# Patient Record
Sex: MALE | Race: BLACK OR AFRICAN AMERICAN | NOT HISPANIC OR LATINO | Employment: FULL TIME | URBAN - METROPOLITAN AREA
[De-identification: names, ages, dates, MRNs, and addresses within clinical notes are randomized per-mention and may not be internally consistent; named-entity substitution may affect disease eponyms.]

---

## 2024-04-20 ENCOUNTER — LAB REQUISITION (OUTPATIENT)
Dept: LAB | Facility: HOSPITAL | Age: 44
End: 2024-04-20
Payer: COMMERCIAL

## 2024-04-21 LAB — PSA SERPL-MCNC: 0.38 NG/ML

## 2024-06-03 PROBLEM — R73.09 ELEVATED GLUCOSE: Status: ACTIVE | Noted: 2024-06-03

## 2024-06-03 PROBLEM — G89.29 BACK PAIN, CHRONIC: Status: ACTIVE | Noted: 2024-06-03

## 2024-06-03 PROBLEM — E55.9 VITAMIN D DEFICIENCY: Status: ACTIVE | Noted: 2024-06-03

## 2024-06-03 PROBLEM — H90.3 BILATERAL SENSORINEURAL HEARING LOSS: Status: ACTIVE | Noted: 2024-06-03

## 2024-06-03 PROBLEM — M54.9 BACK PAIN, CHRONIC: Status: ACTIVE | Noted: 2024-06-03

## 2024-06-03 PROBLEM — D69.6 DECREASED PLATELET COUNT (CMS-HCC): Status: ACTIVE | Noted: 2024-06-03

## 2024-06-03 PROBLEM — H93.13 SUBJECTIVE TINNITUS OF BOTH EARS: Status: ACTIVE | Noted: 2024-06-03

## 2025-02-12 ENCOUNTER — APPOINTMENT (OUTPATIENT)
Dept: RADIOLOGY | Facility: HOSPITAL | Age: 45
End: 2025-02-12
Payer: COMMERCIAL

## 2025-02-12 ENCOUNTER — HOSPITAL ENCOUNTER (EMERGENCY)
Facility: HOSPITAL | Age: 45
Discharge: HOME | End: 2025-02-12
Payer: COMMERCIAL

## 2025-02-12 ENCOUNTER — HOSPITAL ENCOUNTER (OUTPATIENT)
Dept: CARDIOLOGY | Facility: HOSPITAL | Age: 45
Discharge: HOME | End: 2025-02-12
Payer: COMMERCIAL

## 2025-02-12 VITALS
HEART RATE: 80 BPM | WEIGHT: 218 LBS | RESPIRATION RATE: 18 BRPM | DIASTOLIC BLOOD PRESSURE: 81 MMHG | BODY MASS INDEX: 28.76 KG/M2 | OXYGEN SATURATION: 98 % | SYSTOLIC BLOOD PRESSURE: 110 MMHG | TEMPERATURE: 97.4 F

## 2025-02-12 DIAGNOSIS — R05.1 ACUTE COUGH: Primary | ICD-10-CM

## 2025-02-12 LAB
FLUAV RNA RESP QL NAA+PROBE: NOT DETECTED
FLUBV RNA RESP QL NAA+PROBE: NOT DETECTED
RSV RNA RESP QL NAA+PROBE: NOT DETECTED
SARS-COV-2 RNA RESP QL NAA+PROBE: NOT DETECTED

## 2025-02-12 PROCEDURE — 71046 X-RAY EXAM CHEST 2 VIEWS: CPT

## 2025-02-12 PROCEDURE — 93005 ELECTROCARDIOGRAM TRACING: CPT

## 2025-02-12 PROCEDURE — 87637 SARSCOV2&INF A&B&RSV AMP PRB: CPT

## 2025-02-12 PROCEDURE — 99284 EMERGENCY DEPT VISIT MOD MDM: CPT | Mod: 25

## 2025-02-12 PROCEDURE — 71046 X-RAY EXAM CHEST 2 VIEWS: CPT | Performed by: RADIOLOGY

## 2025-02-12 PROCEDURE — 2500000001 HC RX 250 WO HCPCS SELF ADMINISTERED DRUGS (ALT 637 FOR MEDICARE OP)

## 2025-02-12 RX ORDER — AZITHROMYCIN 500 MG/1
500 TABLET, FILM COATED ORAL ONCE
Status: COMPLETED | OUTPATIENT
Start: 2025-02-12 | End: 2025-02-12

## 2025-02-12 RX ORDER — ACETAMINOPHEN 500 MG
1000 TABLET ORAL EVERY 8 HOURS PRN
Qty: 18 TABLET | Refills: 0 | Status: SHIPPED | OUTPATIENT
Start: 2025-02-12 | End: 2025-02-15

## 2025-02-12 RX ORDER — AZITHROMYCIN 250 MG/1
250 TABLET, FILM COATED ORAL DAILY
Qty: 4 TABLET | Refills: 0 | Status: SHIPPED | OUTPATIENT
Start: 2025-02-13 | End: 2025-02-17

## 2025-02-12 RX ORDER — ACETAMINOPHEN 325 MG/1
975 TABLET ORAL ONCE
Status: COMPLETED | OUTPATIENT
Start: 2025-02-12 | End: 2025-02-12

## 2025-02-12 RX ADMIN — ACETAMINOPHEN 975 MG: 325 TABLET, FILM COATED ORAL at 17:56

## 2025-02-12 RX ADMIN — AZITHROMYCIN DIHYDRATE 500 MG: 500 TABLET ORAL at 17:55

## 2025-02-12 ASSESSMENT — PAIN - FUNCTIONAL ASSESSMENT: PAIN_FUNCTIONAL_ASSESSMENT: 0-10

## 2025-02-12 ASSESSMENT — COLUMBIA-SUICIDE SEVERITY RATING SCALE - C-SSRS
1. IN THE PAST MONTH, HAVE YOU WISHED YOU WERE DEAD OR WISHED YOU COULD GO TO SLEEP AND NOT WAKE UP?: NO
2. HAVE YOU ACTUALLY HAD ANY THOUGHTS OF KILLING YOURSELF?: NO
6. HAVE YOU EVER DONE ANYTHING, STARTED TO DO ANYTHING, OR PREPARED TO DO ANYTHING TO END YOUR LIFE?: NO

## 2025-02-12 ASSESSMENT — PAIN SCALES - GENERAL: PAINLEVEL_OUTOF10: 3

## 2025-02-12 ASSESSMENT — PAIN DESCRIPTION - PAIN TYPE: TYPE: ACUTE PAIN

## 2025-02-12 NOTE — ED PROVIDER NOTES
HPI   Chief Complaint   Patient presents with    URI       44-year-old female presents to the ED today with a chief concern of flulike symptoms.  Patient reports that for the past 10 days he has had rhinorrhea, nasal congestion, body aches, and headache.  No fevers.  He reports cough with clear to yellow sputum production.  Reports anterior chest discomfort on coughing.  No history of PE or DVT.  No recent travel or surgeries.  No leg swelling.  No shortness of breath.  No known exposure to sick contacts.  Up-to-date on immunizations except for COVID and flu.  No other symptoms or concerns at this time.      History provided by:  Patient   used: No            Patient History   Past Medical History:   Diagnosis Date    Impacted cerumen, left ear 12/18/2019    Impacted cerumen of left ear    Other muscle spasm 12/17/2018    Spasm of abdominal muscles    Personal history of other diseases of the musculoskeletal system and connective tissue     History of backache    Personal history of other specified conditions 04/05/2017    History of vertigo    Xerosis cutis 04/05/2017    Xerosis cutis     Past Surgical History:   Procedure Laterality Date    HERNIA REPAIR  11/30/2021    Hernia Repair     No family history on file.  Social History     Tobacco Use    Smoking status: Not on file    Smokeless tobacco: Not on file   Substance Use Topics    Alcohol use: Not on file    Drug use: Not on file       Physical Exam   ED Triage Vitals [02/12/25 1619]   Temperature Heart Rate Respirations BP   36.3 °C (97.4 °F) 80 18 110/81      Pulse Ox Temp Source Heart Rate Source Patient Position   98 % Temporal Monitor --      BP Location FiO2 (%)     -- --       Physical Exam  Gen.: Vitals noted no distress afebrile. Normal phonation, no stridor, no trismus. Patient is handling secretions well without any tripod positioning or drooling.  ENT: TMs clear bilaterally, EACs unremarkable. Mastoids nontender. Posterior  oropharynx without erythema, exudate, or swelling. Uvula is in the midline and nonedematous. No Bola's Angina.   Neck: Supple. No meningismus through full range of motion. No lymphadenopathy.   Cardiac: Regular rate rhythm no murmur.  Reproducible chest wall tenderness.  Lungs: Good aeration throughout. No adventitious breath sounds.   Abdomen: Soft nontender nonsurgical throughout  Extremities: No peripheral edema. extremities are moist with good skin turgor.  Skin: No rash.   Neuro: No focal neurologic deficits. cranial nerves II-XII grossly intact.       ED Course & MDM   ED Course as of 02/12/25 1753 Wed Feb 12, 2025   1721 IMPRESSION:  1.  No evidence of acute cardiopulmonary process.              MACRO:  None      Signed by: Cecilia Bush 2/12/2025 5:17 PM  Dictation workstation:   [id number]   [MC]   1742 Viral swabs negative  [MC]   1745 I personally interpreted to the EKG.  Ventricular rate 71 bpm.  ND interval 144 ms.  QRS duration 86 ms.  QT/QTc 386/419 ms.  Patient is in normal sinus rhythm at a rate of 71 bpm.  Normal axis.  There is good R wave progression.  No right or left bundle-branch block.  No ST elevations.  Compared with previous EKGs grossly unchanged. [MC]      ED Course User Index  [MC] Aashish Kramer PA-C         Diagnoses as of 02/12/25 1753   Acute cough                 No data recorded                                 Medical Decision Making  44-year-old male otherwise healthy presents to the ED today with a chief concern of flulike symptoms.  Vital signs reassuring.  Patient overall appears well and is nontoxic-appearing.  Was given azithromycin and Tylenol in the ED. patient has full range of motion of the neck without meningismus.  Satting well on room air.  Not hypoxic.  Not tachycardic.  Afebrile.  No signs of meningitis.  Chest x-ray shows no pneumonia.  Viral swabs negative.  No signs of meningitis.  No signs of PTA or retropharyngeal abscess.  No rash.  Low suspicion for any  sepsis or bacteremia.  EKG shows no ischemic changes.  Do not think further workup with troponins is indicated at this time.  No risk factors for PE or DVT.  Do not think further workup with D-dimer or CT PE study is indicated at this time.  He is afebrile in the ED.  He has clear reproducible chest wall tenderness on exam.  No zoster rash noted.  Will treat with azithromycin outpatient.  Discussed impression and findings with patient he feels comfortable returning home.  We discussed very strict precautions including returning for any new or worsening symptoms.  Patient is in agreement with this plan.  He will follow-up with the PCP within 3 days.    Differential diagnosis: RSV, COVID, influenza, group A strep, PTA, retropharyngeal abscess, meningitis, pneumonia, sepsis, bacteremia    Disposition/treatment  1.  See diagnosis    Shared decision-making was used patient feels comfortable returning home     Patient was advised to follow up with recommended provider in 1 day for another evaluation and exam. I advised patient/guardian to return or go to closest emergency room immediately if symptoms change, get worse, new symptoms develop prior to follow up. If there is no improvement in symptoms in the next 24 hours they are advised to return for further evaluation and exam. I also explained the plan and treatment course. Patient/guardian is in agreement with plan, treatment course, and follow up and states verbally that they will comply.    Patient is homegoing. I discussed the differential; results and discharge plan with the patient and/or family/friend/caregiver if present.  I emphasized the importance of follow-up with the physician I referred them to in the timeframe recommended.  I explained reasons for the patient to return to the Emergency Department. They agreed that if they feel their condition is worsening or if they have any other concern they should call 911 immediately for further assistance. I gave the  patient an opportunity to ask all questions they had and answered all of them accordingly. They understand return precautions and discharge instructions. The patient and/or family/friend/caregiver expressed understanding verbally and that they would comply.        This note has been transcribed using voice recognition and may contain grammatical errors, misplaced words, incorrect words, incorrect phrases or other errors.        Procedure  Procedures     Aashish Kramer PA-C  02/14/25 0707

## 2025-02-20 LAB
ATRIAL RATE: 71 BPM
P AXIS: 74 DEGREES
P OFFSET: 197 MS
P ONSET: 147 MS
PR INTERVAL: 144 MS
Q ONSET: 219 MS
QRS COUNT: 12 BEATS
QRS DURATION: 86 MS
QT INTERVAL: 386 MS
QTC CALCULATION(BAZETT): 419 MS
QTC FREDERICIA: 408 MS
R AXIS: 76 DEGREES
T AXIS: 15 DEGREES
T OFFSET: 412 MS
VENTRICULAR RATE: 71 BPM
